# Patient Record
Sex: MALE | Race: WHITE | NOT HISPANIC OR LATINO | Employment: FULL TIME | URBAN - METROPOLITAN AREA
[De-identification: names, ages, dates, MRNs, and addresses within clinical notes are randomized per-mention and may not be internally consistent; named-entity substitution may affect disease eponyms.]

---

## 2018-01-19 ENCOUNTER — TELEPHONE (OUTPATIENT)
Dept: CARDIOLOGY | Facility: MEDICAL CENTER | Age: 73
End: 2018-01-19

## 2018-01-19 NOTE — TELEPHONE ENCOUNTER
From: Denver Justice M.D.   Sent: 1/18/2018   1:28 PM   To: Elena Freeman R.N.     Pt in ER at [facility] for episode of lightheadedness and indeterminate troponin. No chest pain. Pt has LBBB. Please arrange for pt to be seen next week.   Thanks     Scheduling called patient to schedule a ERCP patient appt. Pts wife states he will be followed by a [facility] cardiologist. She declines appt at this time.